# Patient Record
Sex: FEMALE | Race: BLACK OR AFRICAN AMERICAN | NOT HISPANIC OR LATINO | Employment: PART TIME | URBAN - METROPOLITAN AREA
[De-identification: names, ages, dates, MRNs, and addresses within clinical notes are randomized per-mention and may not be internally consistent; named-entity substitution may affect disease eponyms.]

---

## 2022-06-20 ENCOUNTER — TRANSFERRED RECORDS (OUTPATIENT)
Dept: MULTI SPECIALTY CLINIC | Facility: CLINIC | Age: 42
End: 2022-06-20

## 2024-07-28 ENCOUNTER — HEALTH MAINTENANCE LETTER (OUTPATIENT)
Age: 44
End: 2024-07-28

## 2024-08-26 ENCOUNTER — OFFICE VISIT (OUTPATIENT)
Dept: FAMILY MEDICINE | Facility: CLINIC | Age: 44
End: 2024-08-26
Payer: COMMERCIAL

## 2024-08-26 VITALS
TEMPERATURE: 98 F | RESPIRATION RATE: 19 BRPM | HEIGHT: 66 IN | DIASTOLIC BLOOD PRESSURE: 68 MMHG | BODY MASS INDEX: 32.67 KG/M2 | OXYGEN SATURATION: 97 % | SYSTOLIC BLOOD PRESSURE: 101 MMHG | HEART RATE: 63 BPM | WEIGHT: 203.3 LBS

## 2024-08-26 DIAGNOSIS — G89.29 CHRONIC BILATERAL LOW BACK PAIN WITHOUT SCIATICA: ICD-10-CM

## 2024-08-26 DIAGNOSIS — Z11.4 SCREENING FOR HIV (HUMAN IMMUNODEFICIENCY VIRUS): ICD-10-CM

## 2024-08-26 DIAGNOSIS — Z11.59 NEED FOR HEPATITIS C SCREENING TEST: ICD-10-CM

## 2024-08-26 DIAGNOSIS — Z13.6 CARDIOVASCULAR SCREENING; LDL GOAL LESS THAN 130: ICD-10-CM

## 2024-08-26 DIAGNOSIS — Z23 NEED FOR VACCINATION: ICD-10-CM

## 2024-08-26 DIAGNOSIS — H92.01 RIGHT EAR PAIN: ICD-10-CM

## 2024-08-26 DIAGNOSIS — R79.89 ABNORMAL CBC: ICD-10-CM

## 2024-08-26 DIAGNOSIS — K64.4 EXTERNAL HEMORRHOIDS: ICD-10-CM

## 2024-08-26 DIAGNOSIS — Z12.31 ENCOUNTER FOR SCREENING MAMMOGRAM FOR BREAST CANCER: ICD-10-CM

## 2024-08-26 DIAGNOSIS — E55.9 VITAMIN D DEFICIENCY: ICD-10-CM

## 2024-08-26 DIAGNOSIS — Z13.21 ENCOUNTER FOR VITAMIN DEFICIENCY SCREENING: ICD-10-CM

## 2024-08-26 DIAGNOSIS — M54.50 CHRONIC BILATERAL LOW BACK PAIN WITHOUT SCIATICA: ICD-10-CM

## 2024-08-26 DIAGNOSIS — Z00.00 ANNUAL PHYSICAL EXAM: Primary | ICD-10-CM

## 2024-08-26 DIAGNOSIS — Z13.1 SCREENING FOR DIABETES MELLITUS: ICD-10-CM

## 2024-08-26 DIAGNOSIS — Z13.29 SCREENING FOR THYROID DISORDER: ICD-10-CM

## 2024-08-26 DIAGNOSIS — K59.00 CONSTIPATION, UNSPECIFIED CONSTIPATION TYPE: ICD-10-CM

## 2024-08-26 LAB
ALBUMIN SERPL BCG-MCNC: 4.2 G/DL (ref 3.5–5.2)
ALP SERPL-CCNC: 88 U/L (ref 40–150)
ALT SERPL W P-5'-P-CCNC: <5 U/L (ref 0–50)
ANION GAP SERPL CALCULATED.3IONS-SCNC: 9 MMOL/L (ref 7–15)
AST SERPL W P-5'-P-CCNC: 21 U/L (ref 0–45)
BILIRUB SERPL-MCNC: 0.2 MG/DL
BUN SERPL-MCNC: 12.4 MG/DL (ref 6–20)
CALCIUM SERPL-MCNC: 9.4 MG/DL (ref 8.8–10.4)
CHLORIDE SERPL-SCNC: 108 MMOL/L (ref 98–107)
CHOLEST SERPL-MCNC: 204 MG/DL
CREAT SERPL-MCNC: 0.62 MG/DL (ref 0.51–0.95)
EGFRCR SERPLBLD CKD-EPI 2021: >90 ML/MIN/1.73M2
ERYTHROCYTE [DISTWIDTH] IN BLOOD BY AUTOMATED COUNT: 13 % (ref 10–15)
FASTING STATUS PATIENT QL REPORTED: YES
FASTING STATUS PATIENT QL REPORTED: YES
GLUCOSE SERPL-MCNC: 99 MG/DL (ref 70–99)
HBA1C MFR BLD: 5.6 % (ref 0–5.6)
HCO3 SERPL-SCNC: 24 MMOL/L (ref 22–29)
HCT VFR BLD AUTO: 39.7 % (ref 35–47)
HCV AB SERPL QL IA: NONREACTIVE
HDLC SERPL-MCNC: 65 MG/DL
HGB BLD-MCNC: 12.7 G/DL (ref 11.7–15.7)
HIV 1+2 AB+HIV1 P24 AG SERPL QL IA: NONREACTIVE
LDLC SERPL CALC-MCNC: 131 MG/DL
MCH RBC QN AUTO: 27.9 PG (ref 26.5–33)
MCHC RBC AUTO-ENTMCNC: 32 G/DL (ref 31.5–36.5)
MCV RBC AUTO: 87 FL (ref 78–100)
NONHDLC SERPL-MCNC: 139 MG/DL
PLATELET # BLD AUTO: 198 10E3/UL (ref 150–450)
POTASSIUM SERPL-SCNC: 4 MMOL/L (ref 3.4–5.3)
PROT SERPL-MCNC: 7.4 G/DL (ref 6.4–8.3)
RBC # BLD AUTO: 4.55 10E6/UL (ref 3.8–5.2)
SODIUM SERPL-SCNC: 141 MMOL/L (ref 135–145)
TRIGL SERPL-MCNC: 40 MG/DL
TSH SERPL DL<=0.005 MIU/L-ACNC: 2.42 UIU/ML (ref 0.3–4.2)
VIT B12 SERPL-MCNC: 452 PG/ML (ref 232–1245)
VIT D+METAB SERPL-MCNC: 19 NG/ML (ref 20–50)
WBC # BLD AUTO: 3.5 10E3/UL (ref 4–11)

## 2024-08-26 PROCEDURE — 90472 IMMUNIZATION ADMIN EACH ADD: CPT

## 2024-08-26 PROCEDURE — 99214 OFFICE O/P EST MOD 30 MIN: CPT | Mod: 25

## 2024-08-26 PROCEDURE — 83036 HEMOGLOBIN GLYCOSYLATED A1C: CPT

## 2024-08-26 PROCEDURE — 84207 ASSAY OF VITAMIN B-6: CPT | Mod: 90

## 2024-08-26 PROCEDURE — 82607 VITAMIN B-12: CPT

## 2024-08-26 PROCEDURE — 86803 HEPATITIS C AB TEST: CPT

## 2024-08-26 PROCEDURE — 99000 SPECIMEN HANDLING OFFICE-LAB: CPT

## 2024-08-26 PROCEDURE — 80061 LIPID PANEL: CPT

## 2024-08-26 PROCEDURE — 36415 COLL VENOUS BLD VENIPUNCTURE: CPT

## 2024-08-26 PROCEDURE — 80053 COMPREHEN METABOLIC PANEL: CPT

## 2024-08-26 PROCEDURE — 85027 COMPLETE CBC AUTOMATED: CPT

## 2024-08-26 PROCEDURE — 83540 ASSAY OF IRON: CPT

## 2024-08-26 PROCEDURE — 90746 HEPB VACCINE 3 DOSE ADULT IM: CPT

## 2024-08-26 PROCEDURE — 90715 TDAP VACCINE 7 YRS/> IM: CPT

## 2024-08-26 PROCEDURE — 90471 IMMUNIZATION ADMIN: CPT

## 2024-08-26 PROCEDURE — 83550 IRON BINDING TEST: CPT

## 2024-08-26 PROCEDURE — 84443 ASSAY THYROID STIM HORMONE: CPT

## 2024-08-26 PROCEDURE — 82306 VITAMIN D 25 HYDROXY: CPT

## 2024-08-26 PROCEDURE — 87389 HIV-1 AG W/HIV-1&-2 AB AG IA: CPT

## 2024-08-26 PROCEDURE — 82728 ASSAY OF FERRITIN: CPT

## 2024-08-26 PROCEDURE — 99386 PREV VISIT NEW AGE 40-64: CPT | Mod: 25

## 2024-08-26 RX ORDER — POLYETHYLENE GLYCOL 3350 17 G/17G
1 POWDER, FOR SOLUTION ORAL DAILY
Qty: 850 G | Refills: 3 | Status: SHIPPED | OUTPATIENT
Start: 2024-08-26

## 2024-08-26 RX ORDER — SENNA AND DOCUSATE SODIUM 50; 8.6 MG/1; MG/1
1 TABLET, FILM COATED ORAL AT BEDTIME
Qty: 90 TABLET | Refills: 3 | Status: SHIPPED | OUTPATIENT
Start: 2024-08-26

## 2024-08-26 RX ORDER — BENZOCAINE/MENTHOL 6 MG-10 MG
LOZENGE MUCOUS MEMBRANE 2 TIMES DAILY
Qty: 120 G | Refills: 1 | Status: SHIPPED | OUTPATIENT
Start: 2024-08-26

## 2024-08-26 SDOH — HEALTH STABILITY: PHYSICAL HEALTH: ON AVERAGE, HOW MANY DAYS PER WEEK DO YOU ENGAGE IN MODERATE TO STRENUOUS EXERCISE (LIKE A BRISK WALK)?: 2 DAYS

## 2024-08-26 ASSESSMENT — PAIN SCALES - GENERAL: PAINLEVEL: MODERATE PAIN (4)

## 2024-08-26 ASSESSMENT — SOCIAL DETERMINANTS OF HEALTH (SDOH): HOW OFTEN DO YOU GET TOGETHER WITH FRIENDS OR RELATIVES?: ONCE A WEEK

## 2024-08-26 NOTE — PATIENT INSTRUCTIONS
Lidocaine cream or patch for back pain (pain)    Alternate with Voltaren gel (pain and inflammation)    [facility]  [address], [address], [place], [place], [address] [phone]

## 2024-08-26 NOTE — PROGRESS NOTES
Preventive Care Visit  [facility] FRANKIE Pabon DNP, Geriatric Medicine  Aug 26, 2024      Assessment & Plan     Annual physical exam  UTD on pap (2023), will update labs and vaccines. Will provide number to schedule mammogram  - CBC with platelets; Future  - Comprehensive metabolic panel; Future    Chronic bilateral low back pain without sciatica  Symptoms per HPI, will provide topicals for pain management. Declined PT referral; encouraged to continue with home PT exercises. Will refer to spine for ongoing management  - Spine  Referral; Future  - diclofenac (VOLTAREN) 1 % topical gel; Apply 2 g topically 4 times daily.    External hemorrhoids  Symptoms per HPI, will treat constipation and topicals for discomfort  - Adult GI  Referral - Consult Only; Future  - hydrocortisone (CORTAID) 1 % external cream; Apply topically 2 times daily.    Constipation, unspecified constipation type  Symptoms per HPI; advised Miralax and Senna 2x/day to begin, then can decrease to daily as symptoms continue.   - hydrocortisone (CORTAID) 1 % external cream; Apply topically 2 times daily.  - SENNA-docusate sodium (SENNA S) 8.6-50 MG tablet; Take 1 tablet by mouth at bedtime.  - polyethylene glycol (MIRALAX) 17 GM/Dose powder; Take 17 g (1 Capful) by mouth daily.    Right ear pain  Symptoms per HPI, will refer to ENT   - Adult ENT  Referral    Encounter for vitamin deficiency screening  Taking vitamin D and B12 supplement, interested in checking levels  - Vitamin D Deficiency; Future  - Vitamin B12; Future  - Vitamin B6; Future    Screening for HIV (human immunodeficiency virus)  - HIV Antigen Antibody Combo; Future    Need for hepatitis C screening test  - Hepatitis C Screen Reflex to HCV RNA Quant and Genotype; Future    CARDIOVASCULAR SCREENING; LDL GOAL LESS THAN 130  - Lipid panel reflex to direct LDL Non-fasting; Future    Screening for diabetes mellitus  - Hemoglobin A1c;  "Future    Screening for thyroid disorder  - TSH with free T4 reflex; Future    Encounter for screening mammogram for breast cancer  Will provide phone number to schedule  - MA Screen Bilateral w/Michael; Future    Need for vaccination  - HEPATITIS B, ADULT 20+ (ENGERIX-B/RECOMBIVAX HB)  - TDAP 10-64Y (ADACEL,BOOSTRIX)    Patient has been advised of split billing requirements and indicates understanding: Yes        BMI  Estimated body mass index is 32.81 kg/m  as calculated from the following:    Height as of this encounter: 1.676 m (5' 6\").    Weight as of this encounter: 92.2 kg (203 lb 4.8 oz).   Weight management plan: Discussed healthy diet and exercise guidelines    Counseling  Appropriate preventive services were addressed with this patient via screening, questionnaire, or discussion as appropriate for fall prevention, nutrition, physical activity, Tobacco-use cessation, social engagement, weight loss and cognition.  Checklist reviewing preventive services available has been given to the patient.  Reviewed patient's diet, addressing concerns and/or questions.   She is at risk for lack of exercise and has been provided with information to increase physical activity for the benefit of her well-being.   The patient was instructed to see the dentist every 6 months.       CONSULTATION/REFERRAL to GI, ortho spine  FUTURE APPOINTMENTS:       - Follow-up for annual visit or as needed  Work on weight loss  Regular exercise    Sarah Lee is a 44 year old, presenting for the following:  Physical        8/26/2024    10:05 AM   Additional Questions   Roomed by Mayank   Accompanied by Daughter        Via the Health Maintenance questionnaire, the patient has reported the following services have been completed -Mammogram: [facility] 2022-06-20, this information has been sent to the abstraction team.  Health Care Directive  Patient does not have a Health Care Directive or Living Will: Discussed advance care planning " "with patient; however, patient declined at this time.    Presents here to establish care/physical and other concerns with daughter who is helping interpret  No known diagnosed personal or family medical history  Takes vitamin d (5,000 international unit(s) 2x/week), vitamin C (500 mg) and vitamin b12 (1,000 mg)    Low Back pain: Has been present for many years, even since childhood. Got worse in 2020 after her fall. She worked with PT and got better but continues to have chronic pain. Describes it as aching and sharp. No recent numbness/tingling, did have some numbness in her right leg in the past but that has resolved. Was told one of her legs is longer than the other. Tylenol and Ibuprofen have not been helpful. Had used medicine in the past that she got in [place] that has helped ([employer]arm[employer]). Will sometimes do the physical therapy exercises she was given when she remembers.    Shoulder pain: Notes some ongoing right shoulder pain. Notices pain after work where she does a lot of reaching at work (works for [employer]). No injury or trauma to that joint.    Hemorrhoids: Have been present for ~20 years. Feels it is getting worse and is now having difficulty passing stool. Feels constipation. Drinks green tea to help with constipation. Feels she has to \"force\" out her BMs. Notices blood when wiping. Tried some remedies while in [place] but nothing helped much. Some itching and burning and back pain. BMs once every 3 days, maybe daily if she drinks green tea. Declines abdominal pain    Ear pain: Chronic ear pain of right ear (~15 years), comes and goes. Does use Q-tips regularly. Left ear has sounds of an \"ocean\" in it on occasion. Not every day.                   8/26/2024   General Health   How would you rate your overall physical health? (!) FAIR   Feel stress (tense, anxious, or unable to sleep) Not at all            8/26/2024   Nutrition   Three or more servings of calcium each day? (!) I DON'T KNOW   Diet: Regular (no " restrictions)   How many servings of fruit and vegetables per day? (!) 0-1   How many sweetened beverages each day? 0-1            8/26/2024   Exercise   Days per week of moderate/strenous exercise 2 days      (!) EXERCISE CONCERN      8/26/2024   Social Factors   Frequency of gathering with friends or relatives Once a week   Worry food won't last until get money to buy more No   Food not last or not have enough money for food? No   Do you have housing? (Housing is defined as stable permanent housing and does not include staying ouside in a car, in a tent, in an abandoned building, in an overnight shelter, or couch-surfing.) Yes   Are you worried about losing your housing? No   Lack of transportation? No   Unable to get utilities (heat,electricity)? No            8/26/2024   Dental   Dentist two times every year? (!) NO            8/26/2024   TB Screening   Were you born outside of the [place]? Yes            Today's PHQ-2 Score:       8/26/2024     9:56 AM   PHQ-2 ( 1999 Pfizer)   Q1: Little interest or pleasure in doing things 2   Q2: Feeling down, depressed or hopeless 0   PHQ-2 Score 2   Q1: Little interest or pleasure in doing things More than half the days   Q2: Feeling down, depressed or hopeless Not at all   PHQ-2 Score 2           8/26/2024   Substance Use   Alcohol more than 3/day or more than 7/wk Not Applicable   Do you use any other substances recreationally? No        Social History     Tobacco Use    Smoking status: Never    Smokeless tobacco: Never   Substance Use Topics    Alcohol use: Not Currently          Mammogram Screening - Mammogram every 1-2 years updated in Health Maintenance based on mutual decision making          8/26/2024   One time HIV Screening   Previous HIV test? Yes          8/26/2024   STI Screening   New sexual partner(s) since last STI/HIV test? No        History of abnormal Pap smear: No - age 30-64 HPV with reflex Pap every 5 years recommended       ASCVD Risk   The ASCVD  "Risk score (Natalie ALEXANDER, et al., 2019) failed to calculate for the following reasons:    Cannot find a previous HDL lab    Cannot find a previous total cholesterol lab        8/26/2024   Contraception/Family Planning   Questions about contraception or family planning No           Reviewed and updated as needed this visit by Provider   Tobacco   Meds  Problems  Med Hx  Surg Hx  Fam Hx  Soc Hx Sexual   Activity          Past Medical History:   Diagnosis Date    External hemorrhoids      History reviewed. No pertinent surgical history.  Lab work is in process      Review of Systems  Constitutional, HEENT, cardiovascular, pulmonary, gi and gu systems are negative, except as otherwise noted.     Objective    Exam  /68 (BP Location: Left arm, Patient Position: Sitting, Cuff Size: Adult Large)   Pulse 63   Temp 98  F (36.7  C) (Oral)   Resp 19   Ht 1.676 m (5' 6\")   Wt 92.2 kg (203 lb 4.8 oz)   SpO2 97%   BMI 32.81 kg/m     Estimated body mass index is 32.81 kg/m  as calculated from the following:    Height as of this encounter: 1.676 m (5' 6\").    Weight as of this encounter: 92.2 kg (203 lb 4.8 oz).    Physical Exam  Vitals reviewed.   HENT:      Head: Normocephalic.      Right Ear: Tympanic membrane, ear canal and external ear normal.      Left Ear: Tympanic membrane, ear canal and external ear normal.   Eyes:      Pupils: Pupils are equal, round, and reactive to light.   Cardiovascular:      Rate and Rhythm: Normal rate and regular rhythm.      Pulses: Normal pulses.      Heart sounds: Normal heart sounds. No murmur heard.  Pulmonary:      Effort: Pulmonary effort is normal. No respiratory distress.      Breath sounds: Normal breath sounds. No wheezing, rhonchi or rales.   Chest:      Comments: Deferred  Abdominal:      General: Bowel sounds are normal. There is no distension.      Palpations: Abdomen is soft. There is no mass.      Tenderness: There is no abdominal tenderness.   Musculoskeletal:    "      General: Normal range of motion.      Cervical back: Normal range of motion and neck supple.      Right lower leg: No edema.      Left lower leg: No edema.   Skin:     General: Skin is warm and dry.   Neurological:      Mental Status: She is alert and oriented to person, place, and time.   Psychiatric:         Mood and Affect: Mood normal.         Behavior: Behavior normal.               Signed Electronically by: Yolande Pabon, DNP, APRN, CNP

## 2024-08-27 ENCOUNTER — HOSPITAL ENCOUNTER (OUTPATIENT)
Dept: MAMMOGRAPHY | Facility: CLINIC | Age: 44
Discharge: HOME | End: 2024-08-27
Payer: COMMERCIAL

## 2024-08-27 DIAGNOSIS — Z12.31 ENCOUNTER FOR SCREENING MAMMOGRAM FOR BREAST CANCER: ICD-10-CM

## 2024-08-27 LAB
FERRITIN SERPL-MCNC: 140 NG/ML (ref 6–175)
IRON BINDING CAPACITY (ROCHE): 249 UG/DL (ref 240–430)
IRON SATN MFR SERPL: 35 % (ref 15–46)
IRON SERPL-MCNC: 86 UG/DL (ref 37–145)

## 2024-08-27 PROCEDURE — 77063 BREAST TOMOSYNTHESIS BI: CPT

## 2024-08-28 ENCOUNTER — TELEPHONE (OUTPATIENT)
Dept: SURGERY | Facility: CLINIC | Age: 44
End: 2024-08-28
Payer: COMMERCIAL

## 2024-08-28 LAB — PYRIDOXAL PHOS SERPL-SCNC: 15.4 NMOL/L

## 2024-08-28 NOTE — TELEPHONE ENCOUNTER
Unable to LVM, sent myc for the patient to call back and schedule the following:    Appointment type: New Patient  Provider: Alondra Youssef, Nicki Aguilar or Ashley Woo  Return date: Next available appt  Specialty phone number: [phone]  Additional appointment(s) needed: n/a  Additonal Notes: referred by Yolande Pabon for external hemorrhoids - ok to schedule per Chante CELIS RN      Left CC#

## 2024-09-03 ENCOUNTER — APPOINTMENT (OUTPATIENT)
Dept: INTERPRETER SERVICES | Facility: CLINIC | Age: 44
End: 2024-09-03
Payer: COMMERCIAL

## 2024-09-03 RX ORDER — CHOLECALCIFEROL (VITAMIN D3) 50 MCG
1 TABLET ORAL DAILY
Qty: 90 TABLET | Refills: 3 | Status: SHIPPED | OUTPATIENT
Start: 2024-10-23

## 2024-09-03 NOTE — TELEPHONE ENCOUNTER
Diagnosis, Referred by & from: External Hemorrhoids   Appt date: 11/5/2024   NOTES STATUS DETAILS   OFFICE NOTE from referring provider Internal [facility]  [facility]:  8/26/24 - PCC OV with Yolande Pabon NP   OFFICE NOTE from other specialist Care Everywhere [facility]:  3/9/23 - OBGYN OV with Dr. Hodgson   DISCHARGE SUMMARY from hospital N/A    DISCHARGE REPORT from the ER N/A    OPERATIVE REPORT N/A    MEDICATION LIST Internal    LABS N/A    DIAGNOSTIC PROCEDURES N/A    IMAGING (DISC & REPORT) N/A

## 2024-09-03 NOTE — TELEPHONE ENCOUNTER
Patient confirmed scheduled appointment:  Date: 11/5/24  Time: 9:00 am  Visit type: New Patient  Provider: Alondra Youssef  Location: [facility]  Testing/imaging: n/a  Additional notes: n/a

## 2024-09-04 ENCOUNTER — TELEPHONE (OUTPATIENT)
Dept: FAMILY MEDICINE | Facility: CLINIC | Age: 44
End: 2024-09-04
Payer: COMMERCIAL

## 2024-09-04 NOTE — TELEPHONE ENCOUNTER
Patient Contact    Attempt # 1 - called pt via [employer]      Was call answered?  Yes. Pt verbalized understanding. Will take the high dose weekly vitamin D for 8 weeks then switch to daily 2000 units and recheck a vitamin D level     Vivian WADDELL Triage RN  [facility]

## 2024-09-04 NOTE — TELEPHONE ENCOUNTER
Yolande Pabon, RENNY  [facility]  Prescription sent for high dose vitamin d (50,000 international unit(s) to be taken once daily for 8 weeks with a repeat vitamin d blood level to be drawn after that. Once she is done with the weekly dosing, she can start taking 2,000 international unit(s) daily. I sent both to her pharmacy and she can schedule a lab visit for the blood draw in about 2 months or whenever she completes the two month course.    Thanks,  Yolande

## 2024-09-09 DIAGNOSIS — E53.1 VITAMIN B6 DEFICIENCY: Primary | ICD-10-CM

## 2024-09-16 ENCOUNTER — TELEPHONE (OUTPATIENT)
Dept: FAMILY MEDICINE | Facility: CLINIC | Age: 44
End: 2024-09-16
Payer: COMMERCIAL

## 2024-09-16 NOTE — TELEPHONE ENCOUNTER
----- Message from Yolande Pabon sent at 9/15/2024  9:24 PM CDT -----  Team - please call patient with most recent result note and inform her of supplement recommendations.     Thanks!  Yolande

## 2024-09-16 NOTE — TELEPHONE ENCOUNTER
LM for patient to return our call back - please give message below to patient.          Prescription for Vitamin B complex has been sent to pharmacy below:    [id number] - [place] - [address]       Aziza JACOME MA on 9/16/2024 at 11:14 AM

## 2024-09-19 ENCOUNTER — TELEPHONE (OUTPATIENT)
Dept: FAMILY MEDICINE | Facility: CLINIC | Age: 44
End: 2024-09-19
Payer: COMMERCIAL

## 2024-09-19 NOTE — TELEPHONE ENCOUNTER
Pt son calling (verbal consent to communicate with son) about Vitamin B complex with Vitamin C. Pt son was not able to  prescription due to pharmacy saying that they do no have her script on filed. Son requesting proof of the script to bring to the pharmacy for . Writer sent a screenshot of script through [employer]. Pt and son has no additional questions or concerns.     Parth Inman RN  [facility]

## 2024-09-27 ENCOUNTER — TELEPHONE (OUTPATIENT)
Dept: OTOLARYNGOLOGY | Facility: CLINIC | Age: 44
End: 2024-09-27
Payer: COMMERCIAL

## 2024-11-05 ENCOUNTER — PRE VISIT (OUTPATIENT)
Dept: SURGERY | Facility: CLINIC | Age: 44
End: 2024-11-05

## 2024-11-05 ENCOUNTER — OFFICE VISIT (OUTPATIENT)
Dept: SURGERY | Facility: CLINIC | Age: 44
End: 2024-11-05
Payer: COMMERCIAL

## 2024-11-05 VITALS
DIASTOLIC BLOOD PRESSURE: 74 MMHG | OXYGEN SATURATION: 100 % | HEIGHT: 66 IN | WEIGHT: 201.4 LBS | HEART RATE: 75 BPM | BODY MASS INDEX: 32.37 KG/M2 | SYSTOLIC BLOOD PRESSURE: 112 MMHG

## 2024-11-05 DIAGNOSIS — K59.09 OTHER CONSTIPATION: ICD-10-CM

## 2024-11-05 DIAGNOSIS — K64.8 INTERNAL HEMORRHOIDS: Primary | ICD-10-CM

## 2024-11-05 DIAGNOSIS — K62.89 HYPERTROPHIED ANAL PAPILLA: ICD-10-CM

## 2024-11-05 PROCEDURE — 46221 LIGATION OF HEMORRHOID(S): CPT | Performed by: NURSE PRACTITIONER

## 2024-11-05 PROCEDURE — 99204 OFFICE O/P NEW MOD 45 MIN: CPT | Mod: 25 | Performed by: NURSE PRACTITIONER

## 2024-11-05 ASSESSMENT — PAIN SCALES - GENERAL: PAINLEVEL_OUTOF10: MODERATE PAIN (5)

## 2024-11-05 NOTE — NURSING NOTE
"Chief Complaint   Patient presents with    Consult     External hemorrhids       Vitals:    11/05/24 0814   BP: 112/74   BP Location: Left arm   Patient Position: Sitting   Cuff Size: Adult Regular   Pulse: 75   SpO2: 100%   Weight: 201 lb 6.4 oz   Height: 5' 6\"       Body mass index is 32.51 kg/m .    Amanda Faustin, EMT  "

## 2024-11-05 NOTE — LETTER
"2024       RE: Rosa Pearce  [address] [place] [address]     Dear Colleague,    Thank you for referring your patient, Rosa Pearce, to the [facility] [place] [facility]. Please see a copy of my visit note below.    Colon and Rectal Surgery Consult Clinic Note    Referring provider:  Yolande Pabon, RENNY  [address] [place] [address]     RE: Rosa Pearce  : 1980  SLIME: 2024    Rosa Pearce is a very pleasant 44 year old female here for hemorrhoids. Visit was conducted with  via tablet.    HPI:  Has a hemorrhoid that gets in the way of her bowel movements. She has to push it out of the way. Pain is sharp and pulsing. Some bleeding when it \"cracks\". Stools are always hard unless she takes a laxative of some kind. Takes senna every other day. Has had this issue for the last 10 years.   Has never had a colonoscopy. Has had 5 vaginal deliveries. Has had tearing with all of these. No difficulty holding stool or flatus.    Physical Examination:  /74 (BP Location: Left arm, Patient Position: Sitting, Cuff Size: Adult Regular)   Pulse 75   Ht 5' 6\"   Wt 201 lb 6.4 oz   SpO2 100%   BMI 32.51 kg/m    General: alert, oriented, in no acute distress, sitting comfortably  HEENT: mucous membranes moist    Perianal external examination: Exam was chaperoned by XOCHILT Khanna   Perianal skin: Intact with no excoriation or lichenification.  Lesions: No evidence of an external lesion, nodularity, or induration in the perianal region.  Eversion of buttocks: There was not evidence of an anal fissure. Details: N/A.  Skin tags or external hemorrhoids: Yes: small skin fold in the anterior position.    Digital rectal examination: Was performed.   Sphincter tone: Good.  Palpable lesions: No.  Other: None.  Bimanual examination: was not performed    Anoscopy: Was performed.   Hemorrhoids: Yes. Grade " 2-3 internal hemorrhoids and a hypertrophied anal papilla in the posterior position    Procedures:  After discussing the risks and benefits, the patient agreed to proceed with internal hemorrhoidal banding.    Prior to the start of the procedure and with procedural staff participation, I verbally confirmed the patient s identity using two indicators, relevant allergies, that the procedure was appropriate and matched the consent or emergent situation, and that the correct equipment/implants were available. Immediately prior to starting the procedure I conducted the Time Out with the procedural staff and re-confirmed the patient s name, procedure, and site/side. (The Joint Commission universal protocol was followed.)  Yes    Sedation (Moderate or Deep): None    A suction hemorrhoidal  was used to place a total of 1 band(s) in the right posterior position(s).    There was no significant bleeding. The patient tolerated the procedure well.    This procedure was performed under a collaborative privileging agreement with Dr. Kevin Austin, Chief Division of Colon and Rectal Surgery    Assessment/Plan: 44 year old female with constipation, internal hemorrhoids, and a hypertrophied anal papilla. Advised starting a daily fiber supplement to improve stool consistency. Discussed managing these with hemorrhoidal banding. Discussed that several banding procedures may be needed and that this will not resolve the hypertrophied anal papilla and if that continues to be bothersome, can return after one month to discuss excision in the OR. Patient states an understanding of this and states an understanding that there is a small risk of bleeding today and when the band falls off in 1-2 weeks. Also advised patient that there is a remote chance of infection. Recommended avoiding heavy lifting and remain off aspirin for two weeks. Patient verbalized an understanding of these risks and wished to proceed today. She tolerated  banding well. Return after one month for any persistent symptoms.      Medical history:  Past Medical History:   Diagnosis Date     External hemorrhoids        Surgical history:  No past surgical history on file.    Problem list:  There are no active problems to display for this patient.      Medications:  Current Outpatient Medications   Medication Sig Dispense Refill     diclofenac (VOLTAREN) 1 % topical gel Apply 2 g topically 4 times daily. 350 g 3     hydrocortisone (CORTAID) 1 % external cream Apply topically 2 times daily. 120 g 1     polyethylene glycol (MIRALAX) 17 GM/Dose powder Take 17 g (1 Capful) by mouth daily. 850 g 3     SENNA-docusate sodium (SENNA S) 8.6-50 MG tablet Take 1 tablet by mouth at bedtime. 90 tablet 3     vitamin B complex with vitamin C (STRESS TAB) tablet Take 1 tablet by mouth daily. 90 tablet 1     vitamin D3 (CHOLECALCIFEROL) 50 mcg (2000 units) tablet Take 1 tablet (50 mcg) by mouth daily. Do not start before October 23, 2024. 90 tablet 3       Allergies:  No Known Allergies    Family history:  Family History   Problem Relation Age of Onset     No Known Problems Mother      Hypertension Father      No Known Problems Daughter        Social history:  Social History     Tobacco Use     Smoking status: Never     Smokeless tobacco: Never   Substance Use Topics     Alcohol use: Not Currently    Marital status: .    There are no exam notes on file for this visit.     45 minutes spent on the date of encounter performing chart review, history and exam, documentation and further activities as noted above with an additional 2 minutes for anoscopy.     ALANNA Cuello, NP-C  Colon and Rectal Surgery   [facility]    This note was created using speech recognition software and may contain unintended word substitutions.      Again, thank you for allowing me to participate in the care of your patient.      Sincerely,    Alondra Novak,  APRN CNP

## 2024-11-05 NOTE — PATIENT INSTRUCTIONS
Start a daily fiber supplement such as Citrucel or Metamucil. Start with once a day and slowly increase up to three times a day, if needed, over the next 4-6 weeks  Return in one month if symptoms persist

## 2024-11-05 NOTE — PROGRESS NOTES
"Colon and Rectal Surgery Consult Clinic Note    Referring provider:  Yolande Pabon, RENNY  [address] [place] [address]     RE: Rosa Pearce  : 1980  SLIME: 2024    Rosa Pearce is a very pleasant 44 year old female here for hemorrhoids. Visit was conducted with  via tablet.    HPI:  Has a hemorrhoid that gets in the way of her bowel movements. She has to push it out of the way. Pain is sharp and pulsing. Some bleeding when it \"cracks\". Stools are always hard unless she takes a laxative of some kind. Takes senna every other day. Has had this issue for the last 10 years.   Has never had a colonoscopy. Has had 5 vaginal deliveries. Has had tearing with all of these. No difficulty holding stool or flatus.    Physical Examination:  /74 (BP Location: Left arm, Patient Position: Sitting, Cuff Size: Adult Regular)   Pulse 75   Ht 5' 6\"   Wt 201 lb 6.4 oz   SpO2 100%   BMI 32.51 kg/m    General: alert, oriented, in no acute distress, sitting comfortably  HEENT: mucous membranes moist    Perianal external examination: Exam was chaperoned by XOCHILT Khanna   Perianal skin: Intact with no excoriation or lichenification.  Lesions: No evidence of an external lesion, nodularity, or induration in the perianal region.  Eversion of buttocks: There was not evidence of an anal fissure. Details: N/A.  Skin tags or external hemorrhoids: Yes: small skin fold in the anterior position.    Digital rectal examination: Was performed.   Sphincter tone: Good.  Palpable lesions: No.  Other: None.  Bimanual examination: was not performed    Anoscopy: Was performed.   Hemorrhoids: Yes. Grade 2-3 internal hemorrhoids and a hypertrophied anal papilla in the posterior position    Procedures:  After discussing the risks and benefits, the patient agreed to proceed with internal hemorrhoidal banding.    Prior to the start of the procedure and with procedural staff participation, I verbally confirmed the patient s " identity using two indicators, relevant allergies, that the procedure was appropriate and matched the consent or emergent situation, and that the correct equipment/implants were available. Immediately prior to starting the procedure I conducted the Time Out with the procedural staff and re-confirmed the patient s name, procedure, and site/side. (The Joint Commission universal protocol was followed.)  Yes    Sedation (Moderate or Deep): None    A suction hemorrhoidal  was used to place a total of 1 band(s) in the right posterior position(s).    There was no significant bleeding. The patient tolerated the procedure well.    This procedure was performed under a collaborative privileging agreement with Dr. Kevin Austin, Chief Division of Colon and Rectal Surgery    Assessment/Plan: 44 year old female with constipation, internal hemorrhoids, and a hypertrophied anal papilla. Advised starting a daily fiber supplement to improve stool consistency. Discussed managing these with hemorrhoidal banding. Discussed that several banding procedures may be needed and that this will not resolve the hypertrophied anal papilla and if that continues to be bothersome, can return after one month to discuss excision in the OR. Patient states an understanding of this and states an understanding that there is a small risk of bleeding today and when the band falls off in 1-2 weeks. Also advised patient that there is a remote chance of infection. Recommended avoiding heavy lifting and remain off aspirin for two weeks. Patient verbalized an understanding of these risks and wished to proceed today. She tolerated banding well. Return after one month for any persistent symptoms.      Medical history:  Past Medical History:   Diagnosis Date    External hemorrhoids        Surgical history:  No past surgical history on file.    Problem list:  There are no active problems to display for this patient.      Medications:  Current Outpatient  Medications   Medication Sig Dispense Refill    diclofenac (VOLTAREN) 1 % topical gel Apply 2 g topically 4 times daily. 350 g 3    hydrocortisone (CORTAID) 1 % external cream Apply topically 2 times daily. 120 g 1    polyethylene glycol (MIRALAX) 17 GM/Dose powder Take 17 g (1 Capful) by mouth daily. 850 g 3    SENNA-docusate sodium (SENNA S) 8.6-50 MG tablet Take 1 tablet by mouth at bedtime. 90 tablet 3    vitamin B complex with vitamin C (STRESS TAB) tablet Take 1 tablet by mouth daily. 90 tablet 1    vitamin D3 (CHOLECALCIFEROL) 50 mcg (2000 units) tablet Take 1 tablet (50 mcg) by mouth daily. Do not start before October 23, 2024. 90 tablet 3       Allergies:  No Known Allergies    Family history:  Family History   Problem Relation Age of Onset    No Known Problems Mother     Hypertension Father     No Known Problems Daughter        Social history:  Social History     Tobacco Use    Smoking status: Never    Smokeless tobacco: Never   Substance Use Topics    Alcohol use: Not Currently    Marital status: .    There are no exam notes on file for this visit.     45 minutes spent on the date of encounter performing chart review, history and exam, documentation and further activities as noted above with an additional 2 minutes for anoscopy.     ALANNA Cuello, NP-C  Colon and Rectal Surgery   [facility]    This note was created using speech recognition software and may contain unintended word substitutions.

## 2024-12-18 ENCOUNTER — OFFICE VISIT (OUTPATIENT)
Dept: SURGERY | Facility: CLINIC | Age: 44
End: 2024-12-18
Payer: COMMERCIAL

## 2024-12-18 VITALS
OXYGEN SATURATION: 99 % | HEIGHT: 66 IN | WEIGHT: 203.4 LBS | DIASTOLIC BLOOD PRESSURE: 69 MMHG | BODY MASS INDEX: 32.69 KG/M2 | SYSTOLIC BLOOD PRESSURE: 123 MMHG | HEART RATE: 71 BPM

## 2024-12-18 DIAGNOSIS — K64.8 HEMORRHOID PROLAPSE: Primary | ICD-10-CM

## 2024-12-18 ASSESSMENT — PAIN SCALES - GENERAL: PAINLEVEL_OUTOF10: NO PAIN (0)

## 2024-12-18 NOTE — PATIENT INSTRUCTIONS
Start a daily fiber supplement such as Citrucel or Metamucil POWDER. Start with 2 tablespoons daily and slowly increase up to three times a day, if needed, over the next 4-6 weeks. If you are taking the fiber supplement three times a day that means you are taking 2 tablespoons three times a day (total 6 tablespoons daily).

## 2024-12-18 NOTE — LETTER
"2024       RE: Rosa Pearce  [address] [place] [address]     Dear Colleague,    Thank you for referring your patient, Rosa Pearce, to the [facility] [place] at [facility]. Please see a copy of my visit note below.    Colon and Rectal Surgery Consult Clinic Note    Referring provider:  Yolande Pabon, RENNY  [address] [place] [address]     RE: Rosa Pearce  : 1980  SLIME: 2024    Rosa Pearce is a very pleasant 44 year old female here for hemorrhoids. Visit was conducted with  via tablet.    HPI:  I have seen Rosa in the past for hemorrhoid prolapse and banding. She continues to have prolapsing tissue on the left but the right feels better.  Has never had a colonoscopy. Has had 5 vaginal deliveries. Has had tearing with all of these. No difficulty holding stool or flatus.    Physical Examination:  /69 (BP Location: Left arm, Patient Position: Sitting, Cuff Size: Adult Regular)   Pulse 71   Ht 5' 6\"   Wt 203 lb 6.4 oz   SpO2 99%   BMI 32.83 kg/m    General: alert, oriented, in no acute distress, sitting comfortably  HEENT: mucous membranes moist    Perianal external examination: Exam was chaperoned by XOCHILT Khanna  Perianal skin: Intact with no excoriation or lichenification.  Lesions: No evidence of an external lesion, nodularity, or induration in the perianal region.  Eversion of buttocks: There was not evidence of an anal fissure. Details: N/A.  Skin tags or external hemorrhoids: Yes: small skin fold in the anterior position.    Digital rectal examination: Was performed.   Sphincter tone: Good.  Palpable lesions: No.  Other: None.  Bimanual examination: was not performed    Anoscopy: Was performed.   Hemorrhoids: Yes. Grade 2-3 internal hemorrhoids and a hypertrophied anal papilla in the posterior position    Procedures:  After discussing the risks and benefits, the " patient agreed to proceed with internal hemorrhoidal banding.    Prior to the start of the procedure and with procedural staff participation, I verbally confirmed the patient s identity using two indicators, relevant allergies, that the procedure was appropriate and matched the consent or emergent situation, and that the correct equipment/implants were available. Immediately prior to starting the procedure I conducted the Time Out with the procedural staff and re-confirmed the patient s name, procedure, and site/side. (The Joint Commission universal protocol was followed.)  Yes    Sedation (Moderate or Deep): None    A suction hemorrhoidal  was used to place a total of 2 band(s) in the left posterior and anterior position.    There was no significant bleeding. The patient tolerated the procedure well.    This procedure was performed under a collaborative privileging agreement with Dr. Kevin Austin, Chief Division of Colon and Rectal Surgery    Assessment/Plan: 44 year old female with hemorrhoid prolapse. Discussed managing these with hemorrhoidal banding. Discussed that several banding procedures may be needed and that this will not necessarily resolve the external hemorrhoidal skin tags. Patient states an understanding of this and states an understanding that there is a small risk of bleeding today and when the band falls off in 1-2 weeks. Also advised patient that there is a remote chance of infection. Recommended avoiding heavy lifting and remain off aspirin for two weeks. Patient verbalized an understanding of these risks and wished to proceed today. Continue on daily fiber supplement and follow up after 4-6 weeks for any persistent symptoms.     Medical history:  Past Medical History:   Diagnosis Date     External hemorrhoids        Surgical history:  No past surgical history on file.    Problem list:  There are no active problems to display for this patient.      Medications:  Current Outpatient  "Medications   Medication Sig Dispense Refill     diclofenac (VOLTAREN) 1 % topical gel Apply 2 g topically 4 times daily. (Patient not taking: Reported on 11/5/2024) 350 g 3     hydrocortisone (CORTAID) 1 % external cream Apply topically 2 times daily. (Patient not taking: Reported on 11/5/2024) 120 g 1     polyethylene glycol (MIRALAX) 17 GM/Dose powder Take 17 g (1 Capful) by mouth daily. (Patient not taking: Reported on 11/5/2024) 850 g 3     SENNA-docusate sodium (SENNA S) 8.6-50 MG tablet Take 1 tablet by mouth at bedtime. (Patient not taking: Reported on 11/5/2024) 90 tablet 3     vitamin B complex with vitamin C (STRESS TAB) tablet Take 1 tablet by mouth daily. (Patient not taking: Reported on 11/5/2024) 90 tablet 1     vitamin D3 (CHOLECALCIFEROL) 50 mcg (2000 units) tablet Take 1 tablet (50 mcg) by mouth daily. Do not start before October 23, 2024. (Patient not taking: Reported on 11/5/2024) 90 tablet 3       Allergies:  No Known Allergies    Family history:  Family History   Problem Relation Age of Onset     No Known Problems Mother      Hypertension Father      No Known Problems Daughter        Social history:  Social History     Tobacco Use     Smoking status: Never     Smokeless tobacco: Never   Substance Use Topics     Alcohol use: Not Currently    Marital status: .    Nursing Notes:   Amanda Faustin  12/18/2024  9:42 AM  Signed  Chief Complaint   Patient presents with     Follow Up     banding       Vitals:    12/18/24 0937   BP: 123/69   BP Location: Left arm   Patient Position: Sitting   Cuff Size: Adult Regular   Pulse: 71   SpO2: 99%   Weight: 203 lb 6.4 oz   Height: 5' 6\"       Body mass index is 32.83 kg/m .    XOCHILT Khanna     20 minutes spent on the date of encounter performing chart review, history and exam, documentation and further activities as noted above with an additional 5 minutes for anoscopy and banding.    ALANNA Cuello, NP-C  Colon and Rectal Surgery "   [facility]    This note was created using speech recognition software and may contain unintended word substitutions.      Again, thank you for allowing me to participate in the care of your patient.      Sincerely,    ALANNA Monroe CNP

## 2024-12-18 NOTE — PROGRESS NOTES
"Colon and Rectal Surgery Consult Clinic Note    Referring provider:  Yolande Pabon, RENNY  [address] [place] [address]     RE: Rosa Pearce  : 1980  SLIME: 2024    Rosa Pearce is a very pleasant 44 year old female here for hemorrhoids. Visit was conducted with  via tablet.    HPI:  I have seen Rosa in the past for hemorrhoid prolapse and banding. She continues to have prolapsing tissue on the left but the right feels better.  Has never had a colonoscopy. Has had 5 vaginal deliveries. Has had tearing with all of these. No difficulty holding stool or flatus.    Physical Examination:  /69 (BP Location: Left arm, Patient Position: Sitting, Cuff Size: Adult Regular)   Pulse 71   Ht 5' 6\"   Wt 203 lb 6.4 oz   SpO2 99%   BMI 32.83 kg/m    General: alert, oriented, in no acute distress, sitting comfortably  HEENT: mucous membranes moist    Perianal external examination: Exam was chaperoned by XOCHILT Khanna  Perianal skin: Intact with no excoriation or lichenification.  Lesions: No evidence of an external lesion, nodularity, or induration in the perianal region.  Eversion of buttocks: There was not evidence of an anal fissure. Details: N/A.  Skin tags or external hemorrhoids: Yes: small skin fold in the anterior position.    Digital rectal examination: Was performed.   Sphincter tone: Good.  Palpable lesions: No.  Other: None.  Bimanual examination: was not performed    Anoscopy: Was performed.   Hemorrhoids: Yes. Grade 2-3 internal hemorrhoids and a hypertrophied anal papilla in the posterior position    Procedures:  After discussing the risks and benefits, the patient agreed to proceed with internal hemorrhoidal banding.    Prior to the start of the procedure and with procedural staff participation, I verbally confirmed the patient s identity using two indicators, relevant allergies, that the procedure was appropriate and matched the consent or emergent situation, and that the " correct equipment/implants were available. Immediately prior to starting the procedure I conducted the Time Out with the procedural staff and re-confirmed the patient s name, procedure, and site/side. (The Joint Commission universal protocol was followed.)  Yes    Sedation (Moderate or Deep): None    A suction hemorrhoidal  was used to place a total of 2 band(s) in the left posterior and anterior position.    There was no significant bleeding. The patient tolerated the procedure well.    This procedure was performed under a collaborative privileging agreement with Dr. Kevin Austin, Chief Division of Colon and Rectal Surgery    Assessment/Plan: 44 year old female with hemorrhoid prolapse. Discussed managing these with hemorrhoidal banding. Discussed that several banding procedures may be needed and that this will not necessarily resolve the external hemorrhoidal skin tags. Patient states an understanding of this and states an understanding that there is a small risk of bleeding today and when the band falls off in 1-2 weeks. Also advised patient that there is a remote chance of infection. Recommended avoiding heavy lifting and remain off aspirin for two weeks. Patient verbalized an understanding of these risks and wished to proceed today. Continue on daily fiber supplement and follow up after 4-6 weeks for any persistent symptoms.     Medical history:  Past Medical History:   Diagnosis Date    External hemorrhoids        Surgical history:  No past surgical history on file.    Problem list:  There are no active problems to display for this patient.      Medications:  Current Outpatient Medications   Medication Sig Dispense Refill    diclofenac (VOLTAREN) 1 % topical gel Apply 2 g topically 4 times daily. (Patient not taking: Reported on 11/5/2024) 350 g 3    hydrocortisone (CORTAID) 1 % external cream Apply topically 2 times daily. (Patient not taking: Reported on 11/5/2024) 120 g 1    polyethylene glycol  "(MIRALAX) 17 GM/Dose powder Take 17 g (1 Capful) by mouth daily. (Patient not taking: Reported on 11/5/2024) 850 g 3    SENNA-docusate sodium (SENNA S) 8.6-50 MG tablet Take 1 tablet by mouth at bedtime. (Patient not taking: Reported on 11/5/2024) 90 tablet 3    vitamin B complex with vitamin C (STRESS TAB) tablet Take 1 tablet by mouth daily. (Patient not taking: Reported on 11/5/2024) 90 tablet 1    vitamin D3 (CHOLECALCIFEROL) 50 mcg (2000 units) tablet Take 1 tablet (50 mcg) by mouth daily. Do not start before October 23, 2024. (Patient not taking: Reported on 11/5/2024) 90 tablet 3       Allergies:  No Known Allergies    Family history:  Family History   Problem Relation Age of Onset    No Known Problems Mother     Hypertension Father     No Known Problems Daughter        Social history:  Social History     Tobacco Use    Smoking status: Never    Smokeless tobacco: Never   Substance Use Topics    Alcohol use: Not Currently    Marital status: .    Nursing Notes:   Amanda Faustin  12/18/2024  9:42 AM  Signed  Chief Complaint   Patient presents with    Follow Up     banding       Vitals:    12/18/24 0937   BP: 123/69   BP Location: Left arm   Patient Position: Sitting   Cuff Size: Adult Regular   Pulse: 71   SpO2: 99%   Weight: 203 lb 6.4 oz   Height: 5' 6\"       Body mass index is 32.83 kg/m .    Amanda Faustin, XOCHILT     20 minutes spent on the date of encounter performing chart review, history and exam, documentation and further activities as noted above with an additional 5 minutes for anoscopy and banding.    ALANNA Cuello, NP-C  Colon and Rectal Surgery   [facility]    This note was created using speech recognition software and may contain unintended word substitutions.    "

## 2024-12-18 NOTE — TELEPHONE ENCOUNTER
FUTURE VISIT INFORMATION      FUTURE VISIT INFORMATION:  Date: 4/7/25  Time: 8:20 AM  Location: [facility]   REFERRAL INFORMATION:  Referring provider:    Referring providers clinic:    Reason for visit/diagnosis:  Right ear pain, apt per Pt & , [place] verified     RECORDS REQUESTED FROM      Clinic name Comments Records Status Imaging Status           *no recs to collect

## 2024-12-18 NOTE — NURSING NOTE
"Chief Complaint   Patient presents with    Follow Up     banding       Vitals:    12/18/24 0937   BP: 123/69   BP Location: Left arm   Patient Position: Sitting   Cuff Size: Adult Regular   Pulse: 71   SpO2: 99%   Weight: 203 lb 6.4 oz   Height: 5' 6\"       Body mass index is 32.83 kg/m .    Amanda Faustin, EMT  "

## 2025-04-07 ENCOUNTER — PRE VISIT (OUTPATIENT)
Dept: OTOLARYNGOLOGY | Facility: CLINIC | Age: 45
End: 2025-04-07

## 2025-04-09 ENCOUNTER — MYC REFILL (OUTPATIENT)
Dept: FAMILY MEDICINE | Facility: CLINIC | Age: 45
End: 2025-04-09
Payer: COMMERCIAL

## 2025-04-09 DIAGNOSIS — E53.1 VITAMIN B6 DEFICIENCY: ICD-10-CM

## 2025-04-09 DIAGNOSIS — E55.9 VITAMIN D DEFICIENCY: ICD-10-CM

## 2025-04-09 RX ORDER — CHOLECALCIFEROL (VITAMIN D3) 50 MCG
1 TABLET ORAL DAILY
Qty: 90 TABLET | Refills: 3 | Status: CANCELLED | OUTPATIENT
Start: 2025-04-09

## 2025-07-23 ENCOUNTER — OFFICE VISIT (OUTPATIENT)
Dept: FAMILY MEDICINE | Facility: CLINIC | Age: 45
End: 2025-07-23
Payer: COMMERCIAL

## 2025-07-23 VITALS
HEIGHT: 66 IN | OXYGEN SATURATION: 99 % | BODY MASS INDEX: 32.88 KG/M2 | DIASTOLIC BLOOD PRESSURE: 68 MMHG | WEIGHT: 204.6 LBS | SYSTOLIC BLOOD PRESSURE: 98 MMHG | TEMPERATURE: 97.8 F | HEART RATE: 63 BPM | RESPIRATION RATE: 18 BRPM

## 2025-07-23 DIAGNOSIS — E55.9 VITAMIN D DEFICIENCY: ICD-10-CM

## 2025-07-23 DIAGNOSIS — D72.819 CHRONIC LEUKOPENIA: Primary | ICD-10-CM

## 2025-07-23 DIAGNOSIS — L29.9 ITCHING OF EAR: ICD-10-CM

## 2025-07-23 DIAGNOSIS — H93.11 TINNITUS, RIGHT: ICD-10-CM

## 2025-07-23 DIAGNOSIS — G89.29 CHRONIC RIGHT-SIDED HEADACHE: Primary | ICD-10-CM

## 2025-07-23 DIAGNOSIS — R51.9 CHRONIC RIGHT-SIDED HEADACHE: Primary | ICD-10-CM

## 2025-07-23 DIAGNOSIS — E53.1 VITAMIN B6 DEFICIENCY: ICD-10-CM

## 2025-07-23 DIAGNOSIS — H92.01 RIGHT EAR PAIN: ICD-10-CM

## 2025-07-23 DIAGNOSIS — D72.819 LEUKOPENIA, UNSPECIFIED TYPE: ICD-10-CM

## 2025-07-23 LAB
BASOPHILS # BLD AUTO: 0 10E3/UL (ref 0–0.2)
BASOPHILS NFR BLD AUTO: 1 %
EOSINOPHIL # BLD AUTO: 0.1 10E3/UL (ref 0–0.7)
EOSINOPHIL NFR BLD AUTO: 4 %
ERYTHROCYTE [DISTWIDTH] IN BLOOD BY AUTOMATED COUNT: 13.5 % (ref 10–15)
ERYTHROCYTE [SEDIMENTATION RATE] IN BLOOD BY WESTERGREN METHOD: 14 MM/HR (ref 0–20)
HCT VFR BLD AUTO: 41.8 % (ref 35–47)
HGB BLD-MCNC: 13.3 G/DL (ref 11.7–15.7)
IMM GRANULOCYTES # BLD: 0 10E3/UL
IMM GRANULOCYTES NFR BLD: 0 %
LYMPHOCYTES # BLD AUTO: 1.7 10E3/UL (ref 0.8–5.3)
LYMPHOCYTES NFR BLD AUTO: 48 %
MCH RBC QN AUTO: 27.8 PG (ref 26.5–33)
MCHC RBC AUTO-ENTMCNC: 31.8 G/DL (ref 31.5–36.5)
MCV RBC AUTO: 87 FL (ref 78–100)
MONOCYTES # BLD AUTO: 0.4 10E3/UL (ref 0–1.3)
MONOCYTES NFR BLD AUTO: 10 %
NEUTROPHILS # BLD AUTO: 1.4 10E3/UL (ref 1.6–8.3)
NEUTROPHILS NFR BLD AUTO: 38 %
PLATELET # BLD AUTO: 196 10E3/UL (ref 150–450)
RBC # BLD AUTO: 4.79 10E6/UL (ref 3.8–5.2)
VIT B12 SERPL-MCNC: 360 PG/ML (ref 232–1245)
VIT D+METAB SERPL-MCNC: 17 NG/ML (ref 20–50)
WBC # BLD AUTO: 3.6 10E3/UL (ref 4–11)

## 2025-07-23 PROCEDURE — 36415 COLL VENOUS BLD VENIPUNCTURE: CPT | Performed by: INTERNAL MEDICINE

## 2025-07-23 PROCEDURE — 83615 LACTATE (LD) (LDH) ENZYME: CPT | Performed by: INTERNAL MEDICINE

## 2025-07-23 PROCEDURE — 82607 VITAMIN B-12: CPT | Performed by: INTERNAL MEDICINE

## 2025-07-23 PROCEDURE — 3078F DIAST BP <80 MM HG: CPT | Performed by: INTERNAL MEDICINE

## 2025-07-23 PROCEDURE — 80053 COMPREHEN METABOLIC PANEL: CPT | Performed by: INTERNAL MEDICINE

## 2025-07-23 PROCEDURE — 99207 E-CONSULT TO HEMATOLOGY (ADULT OUTPT PROVIDER TO SPECIALIST WRITTEN QUESTION & RESPONSE): CPT | Performed by: INTERNAL MEDICINE

## 2025-07-23 PROCEDURE — 3074F SYST BP LT 130 MM HG: CPT | Performed by: INTERNAL MEDICINE

## 2025-07-23 PROCEDURE — 85652 RBC SED RATE AUTOMATED: CPT | Performed by: INTERNAL MEDICINE

## 2025-07-23 PROCEDURE — 1125F AMNT PAIN NOTED PAIN PRSNT: CPT | Performed by: INTERNAL MEDICINE

## 2025-07-23 PROCEDURE — 85025 COMPLETE CBC W/AUTO DIFF WBC: CPT | Performed by: INTERNAL MEDICINE

## 2025-07-23 PROCEDURE — 82728 ASSAY OF FERRITIN: CPT | Performed by: INTERNAL MEDICINE

## 2025-07-23 PROCEDURE — 99000 SPECIMEN HANDLING OFFICE-LAB: CPT | Performed by: INTERNAL MEDICINE

## 2025-07-23 PROCEDURE — 82306 VITAMIN D 25 HYDROXY: CPT | Performed by: INTERNAL MEDICINE

## 2025-07-23 PROCEDURE — 83540 ASSAY OF IRON: CPT | Performed by: INTERNAL MEDICINE

## 2025-07-23 PROCEDURE — 84207 ASSAY OF VITAMIN B-6: CPT | Mod: 90 | Performed by: INTERNAL MEDICINE

## 2025-07-23 PROCEDURE — 99214 OFFICE O/P EST MOD 30 MIN: CPT | Performed by: INTERNAL MEDICINE

## 2025-07-23 PROCEDURE — 83550 IRON BINDING TEST: CPT | Performed by: INTERNAL MEDICINE

## 2025-07-23 RX ORDER — AMITRIPTYLINE HYDROCHLORIDE 10 MG/1
10 TABLET ORAL AT BEDTIME
Qty: 30 TABLET | Refills: 0 | Status: SHIPPED | OUTPATIENT
Start: 2025-07-23

## 2025-07-23 ASSESSMENT — PAIN SCALES - GENERAL: PAINLEVEL_OUTOF10: MODERATE PAIN (4)

## 2025-07-23 NOTE — PROGRESS NOTES
"  {PROVIDER CHARTING PREFERENCE:292679}    Sarah Lee is a 45 year old, presenting for the following health issues:  Ear Problem        7/23/2025    10:17 AM   Additional Questions   Roomed by Enat   Accompanied by Rickey (daughter)     Ear Problem    History of Present Illness       Reason for visit:  Ear Pain   She is taking medications regularly.        {MA/LPN/RN Pre-Provider Visit Orders- hCG/UA/Strep (Optional):194658}  {SUPERLIST (Optional):593979}  {additonal problems for provider to add (Optional):767857}    {ROS Picklists (Optional):235276}      Objective    BP 98/68 (BP Location: Right arm, Patient Position: Sitting, Cuff Size: Adult Large)   Pulse 63   Temp 97.8  F (36.6  C) (Tympanic)   Resp 18   Ht 1.676 m (5' 6\")   Wt 92.8 kg (204 lb 9.6 oz)   LMP 07/15/2025 (Approximate)   SpO2 99%   BMI 33.02 kg/m    Body mass index is 33.02 kg/m .  Physical Exam   {Exam List (Optional):665042}    {Diagnostic Test Results (Optional):934538}        Signed Electronically by: Derrick Hernandez MD  {Email feedback regarding this note to [email or web address]   :401245}  " "at bedtime to manage the headache. Follow-up in 3 to 4 weeks to assess the effectiveness of the medication. Consider an MRI and neurology consultation.  Risks and side effects: May cause drowsiness or dizziness.    Itching of ear:  Itching is not due to an infection. No signs of eczema or inflammation in the ear canal.  Use a mixture of rubbing alcohol and white vinegar (50-50) with a cotton ball, applying 2-3 drops in the ear twice a day.    Tinnitus, right:  Tinnitus is acknowledged, but no specific treatment is available.    Right ear pain:  The ear canal and tympanic membrane are clear, suggesting no infection. The pain may be related to migraines.  Manage with amitriptyline as prescribed for headaches. Referral to ENT for further evaluation.    Leukopenia, unspecified type:  Previous labs showed a slightly low white blood count.  Repeat blood count and check inflammatory markers (ESR) today.     BMI  Estimated body mass index is 33.02 kg/m  as calculated from the following:    Height as of this encounter: 1.676 m (5' 6\").    Weight as of this encounter: 92.8 kg (204 lb 9.6 oz).   Weight management plan: Discussed healthy diet and exercise guidelines        Sarah Lee is a 45 year old, presenting for the following health issues:  Ear Problem        7/23/2025    10:17 AM   Additional Questions   Roomed by Meliton   Accompanied by Rickey (daughter)     Ear Problem    History of Present Illness       Reason for visit:  Ear Pain   She is taking medications regularly.          History of Present Illness-  Rosa Pearce, 45 years    Right ear pain and associated symptoms  - History of recurrent right ear pain, described as severe and sometimes reaching 10 out of 10 in intensity  - Pain episodes occur variably: sometimes 2-3 times per week, sometimes once a month  - Each episode lasts for hours  - Pain often starts with itching in the right ear, sometimes without itching but with pain  - Pain spreads from the " right ear to the right side of the head and down into the neck  - During episodes, experiences severe headache on the right side, requiring lying down on the right side for relief  - Reports inability to stand, talk, or listen during severe pain episodes; needs to be in a quiet room as noise worsens symptoms  - No spinning sensation or vertigo reported during episodes  - No vision changes or light sensitivity reported with pain  - No nausea or vomiting associated with pain  - Chewing exacerbates pain during episodes; unable to chew when pain is present  - No sore throat or dental pain reported  - No regular sinus drainage; occasional sinus drainage only  - No history of eczema in general  - No reported decrease in hearing    Right ear itching  - Reports intermittent itching in the right ear, sometimes preceding pain episodes    Tinnitus  - Reports ringing noise in the ear since an incident at the beach approximately 10 years ago, when beach water entered the ears  - Tinnitus is persistent since that time    Previous referrals and appointments  - Was referred to an ear, nose, and throat (ENT) specialist for evaluation of right ear pain  - Had an appointment scheduled for April 7, 2025, but did not attend due to rescheduling and communication issues with the clinic  - Multiple attempts made to reschedule with ENT, but no appointment secured prior to this encounter    Misc  - No current medications taken prior to this encounter  - Previous laboratory results showed a slightly low white blood cell count and normal electrolytes (as reported to patient)     History of Present Illness-  Rosa Pearce, 45 years    Right Ear Pain and Headache  Reports recurrent right ear pain associated with severe, sometimes unbearable, right-sided headache. Pain severity can reach 10 out of 10. Headache and pain episodes occur variably, sometimes 2-3 times per week, sometimes once a month, and can last for hours. Pain often starts with  itching in the right ear, followed by pain that spreads over the right side of the head and down into the neck. During episodes, experiences significant discomfort, cannot stand, talk, or listen, and prefers to lie down on the right side, which provides some relief. Noise aggravates the pain, requiring her to be in a quiet room. Reports dizziness during severe pain but denies spinning sensation or vertigo. No associated vomiting or nausea. No photophobia reported, but prefers to close the eye on the affected side during episodes. Chewing exacerbates the pain during attacks, making it difficult to eat. No sore throat, no sinus problems, and no regular sinus drainage. No dental pain. No regular allergies or congestion. No eczema in the ear canal. No history of decreased hearing reported.    Right Ear Itching and Tinnitus  Describes intermittent itching in the right ear, sometimes preceding pain episodes. Reports a history of ringing noise (tinnitus) in the ear since an incident at the beach approximately ten years ago, when beach water entered her ears. No vertigo or hearing loss reported in association with the tinnitus.    Previous Consultations and Referrals  Had been referred to an ear, nose, and throat (ENT) specialist for evaluation of right ear pain but has not yet been seen. Multiple attempts to schedule and reschedule the ENT appointment have been unsuccessful due to scheduling issues and lack of follow-up from the clinic. Last scheduled ENT appointment was for April 7, 2025, but was not attended due to provider unavailability.    Misc  No current medications taken. No mention of other chronic illnesses or relevant past medical history.     Review of Systems  Constitutional, neuro, ENT, endocrine, pulmonary, cardiac, gastrointestinal, genitourinary, musculoskeletal, integument and psychiatric systems are negative, except as otherwise noted.      Objective    BP 98/68 (BP Location: Right arm, Patient Position:  "Sitting, Cuff Size: Adult Large)   Pulse 63   Temp 97.8  F (36.6  C) (Tympanic)   Resp 18   Ht 1.676 m (5' 6\")   Wt 92.8 kg (204 lb 9.6 oz)   LMP 07/15/2025 (Approximate)   SpO2 99%   BMI 33.02 kg/m    Body mass index is 33.02 kg/m .  Physical Exam   GENERAL: alert and no distress  EYES: Eyes grossly normal to inspection, PERRL and conjunctivae and sclerae normal  HENT: ear canals and TM's normal, nose and mouth without ulcers or lesions  NECK: no adenopathy, no asymmetry, masses, or scars  SKIN: no suspicious lesions or rashes  NEURO: Normal strength and tone, mentation intact and speech normal  PSYCH: mentation appears normal, affect normal/bright    Physical Exam  - HEENT: Ear canal clear, tympanic membrane clear, noninflamed, no eczematous changes, nonswollen, nonerythematous. Pain on pressing the ear tragus. Pupils equal and reactive to light. No cervical lymphadenopathy.  - NEUROLOGIC: No vertigo, no decreased hearing. Intact cranial nerves intact.     Physical ExamHEENT: Ear canal clear, tympanic membrane clear, no signs of otitis, no cerumen, no eczema, no edema, no erythema. Pain on pressing the ear tragus. Pupils equal and reactive to light. No neck masses palpated. Back of the throat clear.     Results  - Electrolytes: Normal  - White blood count: Slightly low         Office Visit on 08/26/2024   Component Date Value Ref Range Status    HIV Antigen Antibody Combo 08/26/2024 Nonreactive  Nonreactive Final    Negative HIV-1 p24 antigen and HIV-1/2 antibody screening test results usually indicate the absence of HIV-1 and HIV-2 infection. However, such negative results do not rule-out acute HIV infection.  If acute HIV-1 or HIV-2 infection is suspected, detection of HIV-1 or HIV-2 RNA  is recommended.     Hepatitis C Antibody 08/26/2024 Nonreactive  Nonreactive Final    A nonreactive screening test result does not exclude the possibility of exposure to or infection with HCV. Nonreactive screening test " results in individuals with prior exposure to HCV may be due to antibody levels below the limit of detection of this assay or lack of reactivity to the HCV antigens used in this assay. Patients with recent HCV infections (<3 months from time of exposure) may have false-negative HCV antibody results due to the time needed for seroconversion (average of 8 to 9 weeks).    Cholesterol 08/26/2024 204 (H)  <200 mg/dL Final    Triglycerides 08/26/2024 40  <150 mg/dL Final    Direct Measure HDL 08/26/2024 65  >=50 mg/dL Final    LDL Cholesterol Calculated 08/26/2024 131 (H)  <=100 mg/dL Final    Non HDL Cholesterol 08/26/2024 139 (H)  <130 mg/dL Final    Patient Fasting > 8hrs? 08/26/2024 Yes   Final    Hemoglobin A1C 08/26/2024 5.6  0.0 - 5.6 % Final    Normal <5.7%   Prediabetes 5.7-6.4%    Diabetes 6.5% or higher     Note: Adopted from ADA consensus guidelines.    TSH 08/26/2024 2.42  0.30 - 4.20 uIU/mL Final    Vitamin D, Total (25-Hydroxy) 08/26/2024 19 (L)  20 - 50 ng/mL Final    mild to moderate deficiency    WBC Count 08/26/2024 3.5 (L)  4.0 - 11.0 10e3/uL Final    RBC Count 08/26/2024 4.55  3.80 - 5.20 10e6/uL Final    Hemoglobin 08/26/2024 12.7  11.7 - 15.7 g/dL Final    Hematocrit 08/26/2024 39.7  35.0 - 47.0 % Final    MCV 08/26/2024 87  78 - 100 fL Final    MCH 08/26/2024 27.9  26.5 - 33.0 pg Final    MCHC 08/26/2024 32.0  31.5 - 36.5 g/dL Final    RDW 08/26/2024 13.0  10.0 - 15.0 % Final    Platelet Count 08/26/2024 198  150 - 450 10e3/uL Final    Sodium 08/26/2024 141  135 - 145 mmol/L Final    Potassium 08/26/2024 4.0  3.4 - 5.3 mmol/L Final    Carbon Dioxide (CO2) 08/26/2024 24  22 - 29 mmol/L Final    Anion Gap 08/26/2024 9  7 - 15 mmol/L Final    Urea Nitrogen 08/26/2024 12.4  6.0 - 20.0 mg/dL Final    Creatinine 08/26/2024 0.62  0.51 - 0.95 mg/dL Final    GFR Estimate 08/26/2024 >90  >60 mL/min/1.73m2 Final    eGFR calculated using 2021 CKD-EPI equation.    Calcium 08/26/2024 9.4  8.8 - 10.4 mg/dL Final     Reference intervals for this test were updated on 7/16/2024 to reflect our healthy population more accurately. There may be differences in the flagging of prior results with similar values performed with this method. Those prior results can be interpreted in the context of the updated reference intervals.    Chloride 08/26/2024 108 (H)  98 - 107 mmol/L Final    Glucose 08/26/2024 99  70 - 99 mg/dL Final    Alkaline Phosphatase 08/26/2024 88  40 - 150 U/L Final    AST 08/26/2024 21  0 - 45 U/L Final    ALT 08/26/2024 <5  0 - 50 U/L Final    Protein Total 08/26/2024 7.4  6.4 - 8.3 g/dL Final    Albumin 08/26/2024 4.2  3.5 - 5.2 g/dL Final    Bilirubin Total 08/26/2024 0.2  <=1.2 mg/dL Final    Patient Fasting > 8hrs? 08/26/2024 Yes   Final    Vitamin B12 08/26/2024 452  232 - 1,245 pg/mL Final    Vitamin B6 08/26/2024 15.4 (L)  20.0 - 125.0 nmol/L Final    INTERPRETIVE INFORMATION: Vitamin B6 (Pyridoxal 5-Phosphate)    Pyridoxal 5'-phosphate measured in a specimen collected   following an 8-hour or overnight fast accurately indicates   vitamin B6 nutritional status. Non-fasting specimen   concentration reflects recent vitamin intake.    This test was developed and its performance characteristics   determined by [facility]. It has not been cleared or   approved by the US Food and Drug Administration. This test   was performed in a CLIA certified laboratory and is   intended for clinical purposes.  Performed By: [facility]  [address] [place] [address]  : Thor Santoyo MD, PhD  CLIA Number: [id number]    Iron 08/26/2024 86  37 - 145 ug/dL Final    Iron Binding Capacity 08/26/2024 249  240 - 430 ug/dL Final    Iron Sat Index 08/26/2024 35  15 - 46 % Final    Ferritin 08/26/2024 140  6 - 175 ng/mL Final           Signed Electronically by: Derrick Hernandez MD

## 2025-07-23 NOTE — PATIENT INSTRUCTIONS
Based on our discussion, I have outlined the following instructions for you:      - You might need an MRI scan of your head. You will be referred to a specialist in brain and nerve health for more checks.  - Take 10 mg of amitriptyline at bedtime. Come back in 3-4 weeks to see how it's working. We might increase the dose if needed.  - Mix equal parts of rubbing alcohol and white vinegar. Use a cotton ball to apply 2-3 drops of this mixture in your ear twice a day.  - You will be referred to an ear, nose, and throat specialist for more checks. This referral is a priority.  - Get your blood tested today to check your white blood cell count and an inflammation marker called ESR.    Thank you again for your visit, and we look forward to supporting you in your journey to better health.

## 2025-07-24 ENCOUNTER — E-CONSULT (OUTPATIENT)
Dept: ONCOLOGY | Facility: CLINIC | Age: 45
End: 2025-07-24
Payer: COMMERCIAL

## 2025-07-24 DIAGNOSIS — D72.819 LEUKOPENIA, UNSPECIFIED TYPE: Primary | ICD-10-CM

## 2025-07-24 LAB
ALBUMIN SERPL BCG-MCNC: 4.1 G/DL (ref 3.5–5.2)
ALP SERPL-CCNC: 100 U/L (ref 40–150)
ALT SERPL W P-5'-P-CCNC: <5 U/L (ref 0–50)
ANION GAP SERPL CALCULATED.3IONS-SCNC: 12 MMOL/L (ref 7–15)
AST SERPL W P-5'-P-CCNC: 20 U/L (ref 0–45)
BILIRUB SERPL-MCNC: <0.2 MG/DL
BUN SERPL-MCNC: 14 MG/DL (ref 6–20)
CALCIUM SERPL-MCNC: 9.8 MG/DL (ref 8.8–10.4)
CHLORIDE SERPL-SCNC: 108 MMOL/L (ref 98–107)
CREAT SERPL-MCNC: 0.82 MG/DL (ref 0.51–0.95)
EGFRCR SERPLBLD CKD-EPI 2021: 90 ML/MIN/1.73M2
FERRITIN SERPL-MCNC: 138 NG/ML (ref 6–175)
GLUCOSE SERPL-MCNC: 108 MG/DL (ref 70–99)
HCO3 SERPL-SCNC: 24 MMOL/L (ref 22–29)
IRON BINDING CAPACITY (ROCHE): 232 UG/DL (ref 240–430)
IRON SATN MFR SERPL: 28 % (ref 15–46)
IRON SERPL-MCNC: 64 UG/DL (ref 37–145)
LDH SERPL L TO P-CCNC: 191 U/L (ref 0–250)
POTASSIUM SERPL-SCNC: 4.3 MMOL/L (ref 3.4–5.3)
PROT SERPL-MCNC: 6.9 G/DL (ref 6.4–8.3)
SODIUM SERPL-SCNC: 144 MMOL/L (ref 135–145)

## 2025-07-24 NOTE — PROGRESS NOTES
2025     E-Consult has been accepted.    Interprofessional consultation requested by:  Derrick Hernandez MD      Clinical Question/Purpose: MY CLINICAL QUESTION IS: chronic leukopenia    Patient assessment and information reviewed: 44 yo woman from [place]  with history of low back pain and hemorrhoids  Found to have WBC 3.6. , ANC 1.4 Unclear if any other autoimmune symptoms or splenomegaly. I could not find any history of nutritional issues   Could work up with blood smear and vitamin levels iron etc  With 5 children could be iron deficient. Concern for Verduzco null associated neutropenia. Would get non ABO blood antigens   Latest Reference Range & Units 25 11:21   WBC 4.0 - 11.0 10e3/uL 3.6 (L)   Hemoglobin 11.7 - 15.7 g/dL 13.3   Hematocrit 35.0 - 47.0 % 41.8   Platelet Count 150 - 450 10e3/uL 196   RBC Count 3.80 - 5.20 10e6/uL 4.79   MCV 78 - 100 fL 87   MCH 26.5 - 33.0 pg 27.8   MCHC 31.5 - 36.5 g/dL 31.8   RDW 10.0 - 15.0 % 13.5   % Neutrophils % 38   % Lymphocytes % 48   % Monocytes % 10   % Eosinophils % 4   % Basophils % 1   % Immature Granulocytes % 0   Absolute Neutrophil 1.6 - 8.3 10e3/uL 1.4 (L)   Absolute Lymphocytes 0.8 - 5.3 10e3/uL 1.7   Absolute Monocytes 0.0 - 1.3 10e3/uL 0.4   Absolute Eosinophils 0.0 - 0.7 10e3/uL 0.1   Absolute Basophils 0.0 - 0.2 10e3/uL 0.0   Absolute Immature Granulocytes <=0.4 10e3/uL 0.0   Sed Rate 0 - 20 mm/hr 14   (L): Data is abnormally low  Recommendations:    Blood smear for path Vit B12 folate, iron tibc ferritin CMP,LDH, Non ABO typing for fya and fyb (Verduzco)     The recommendations provided in this E-Consult are based on a review of clinical data pertinent to the clinical question presented, without a review of the patient's complete medical record or, the benefit of a comprehensive in-person or virtual patient evaluation. This consultation should not replace the clinical judgement and evaluation of the provider ordering this E-Consult.  Any new clinical issues, or changes in patient status since the filing of this E-Consult will need to be taken into account when assessing these recommendations. Please contact me if you have further questions.    My total time spent reviewing clinical information and formulating assessment was 30 minutes.        Fabrizio Monroy MD

## 2025-07-24 NOTE — TELEPHONE ENCOUNTER
Reason for Visit: Right Ear pain    Date of Appointment: 11/6/2025   Notes Status Description   Office Notes from Referring Provider [facility]   7/23/2025 OV: Derrick Hernandez MD   8/26/2024 OV: Yolande Pabon DNP

## 2025-07-26 LAB — PYRIDOXAL PHOS SERPL-SCNC: 14.7 NMOL/L

## 2025-11-06 ENCOUNTER — PRE VISIT (OUTPATIENT)
Dept: OTOLARYNGOLOGY | Facility: CLINIC | Age: 45
End: 2025-11-06